# Patient Record
Sex: MALE | Race: WHITE
[De-identification: names, ages, dates, MRNs, and addresses within clinical notes are randomized per-mention and may not be internally consistent; named-entity substitution may affect disease eponyms.]

---

## 2018-03-20 ENCOUNTER — HOSPITAL ENCOUNTER (INPATIENT)
Dept: HOSPITAL 26 - MED | Age: 48
LOS: 3 days | Discharge: ANOTHER HOSPITAL | DRG: 196 | End: 2018-03-23
Attending: FAMILY MEDICINE | Admitting: FAMILY MEDICINE
Payer: COMMERCIAL

## 2018-03-20 VITALS — HEIGHT: 63 IN | WEIGHT: 126 LBS | BODY MASS INDEX: 22.32 KG/M2

## 2018-03-20 VITALS — DIASTOLIC BLOOD PRESSURE: 66 MMHG | SYSTOLIC BLOOD PRESSURE: 93 MMHG

## 2018-03-20 VITALS — DIASTOLIC BLOOD PRESSURE: 53 MMHG | SYSTOLIC BLOOD PRESSURE: 111 MMHG

## 2018-03-20 DIAGNOSIS — I47.1: ICD-10-CM

## 2018-03-20 DIAGNOSIS — Z94.1: ICD-10-CM

## 2018-03-20 DIAGNOSIS — I49.01: Primary | ICD-10-CM

## 2018-03-20 DIAGNOSIS — I48.91: ICD-10-CM

## 2018-03-20 DIAGNOSIS — E83.42: ICD-10-CM

## 2018-03-20 DIAGNOSIS — Z79.01: ICD-10-CM

## 2018-03-20 DIAGNOSIS — Z87.891: ICD-10-CM

## 2018-03-20 DIAGNOSIS — I73.9: ICD-10-CM

## 2018-03-20 DIAGNOSIS — I47.2: ICD-10-CM

## 2018-03-20 DIAGNOSIS — E78.5: ICD-10-CM

## 2018-03-20 DIAGNOSIS — Z95.810: ICD-10-CM

## 2018-03-20 DIAGNOSIS — Z79.899: ICD-10-CM

## 2018-03-20 DIAGNOSIS — Z79.82: ICD-10-CM

## 2018-03-20 DIAGNOSIS — Z89.511: ICD-10-CM

## 2018-03-20 DIAGNOSIS — I42.9: ICD-10-CM

## 2018-03-20 LAB
ALBUMIN FLD-MCNC: 3 G/DL (ref 3.4–5)
ANION GAP SERPL CALCULATED.3IONS-SCNC: 18.8 MMOL/L (ref 8–16)
AST SERPL-CCNC: 19 U/L (ref 15–37)
BASOPHILS # BLD AUTO: 0.8 K/UL (ref 0–0.22)
BASOPHILS NFR BLD AUTO: 0 % (ref 0–2)
BILIRUB SERPL-MCNC: 0.7 MG/DL (ref 0–1)
BUN SERPL-MCNC: 24 MG/DL (ref 7–18)
CHLORIDE SERPL-SCNC: 103 MMOL/L (ref 98–107)
CO2 SERPL-SCNC: 20.6 MMOL/L (ref 21–32)
CREAT SERPL-MCNC: 1.3 MG/DL (ref 0.7–1.3)
EOSINOPHIL # BLD AUTO: 0.1 K/UL (ref 0–0.4)
EOSINOPHIL NFR BLD AUTO: 1 % (ref 0–4)
ERYTHROCYTE [DISTWIDTH] IN BLOOD BY AUTOMATED COUNT: 16.1 % (ref 11.6–13.7)
GFR SERPL CREATININE-BSD FRML MDRD: 75 ML/MIN (ref 90–?)
GLUCOSE SERPL-MCNC: 149 MG/DL (ref 74–106)
HCT VFR BLD AUTO: 42 % (ref 36–52)
HGB BLD-MCNC: 13.5 G/DL (ref 12–18)
LYMPHOCYTES # BLD AUTO: 2.1 K/UL (ref 2–11.5)
LYMPHOCYTES NFR BLD AUTO: 23 % (ref 20.5–51.1)
MCH RBC QN AUTO: 28 PG (ref 27–31)
MCHC RBC AUTO-ENTMCNC: 32 G/DL (ref 33–37)
MCV RBC AUTO: 86 FL (ref 80–94)
MONOCYTES # BLD AUTO: 0.3 K/UL (ref 0.8–1)
MONOCYTES NFR BLD AUTO: 3 % (ref 1.7–9.3)
NEUTROPHILS # BLD AUTO: 5.8 K/UL (ref 1.8–7.7)
NEUTROPHILS NFR BLD AUTO: 73 % (ref 42.2–75.2)
PLATELET # BLD AUTO: 148 K/UL (ref 140–450)
POTASSIUM SERPL-SCNC: 4.4 MMOL/L (ref 3.5–5.1)
PROTHROMBIN TIME: 13.2 SECS (ref 10.8–13.4)
RBC # BLD AUTO: 4.9 MIL/UL (ref 4.2–6.1)
SODIUM SERPL-SCNC: 138 MMOL/L (ref 136–145)
WBC # BLD AUTO: 9.1 K/UL (ref 4.8–10.8)

## 2018-03-20 NOTE — NUR
PT ARRIVED IN THE UNIT AT 2220 VIA GURNEY. PT AWAKE, ALERT AND ORIENTED. ABLE TO MAKE NEEDS 
KNOWN. PT AFEBRILE. PT COOPERATIVE AND FRIENDLY. SINUS TACHYCARDIA ON MONITOR. PULSES ARE 
PALPABLE IN EXTREMITIES. DENIES CHEST PAIN. PT IN ROOM AIR. DENIES SOB. NO SIGNS OF DISTRESS 
NOTED. ABDOMEN ROUND, SOFT AND NONDISTENDED. BOWEL SOUNDS ACTIVE IN ALL QUADRANTS. PT HAS 
RIGHT AKA. SKIN INTACT. ACCORDING TO PT HE HAS PACEMAKER ON RIGHT CHEST. PT IS CONTINENT AND 
USES URINAL. PT CAME IN WITH RIGHT HAND PERIPHERAL IV 20G, WHEN LINE WAS FLUSHED, IT WAS 
LEAKING AND ALREADY [facility] OUT. LINE WAS DISCONTINUED. PT ALSO CAME IN WITH LEFT FOREARM 
PERIPHERAL IV 20G. LINE FLUSHED AND WAS PATENT AND ASYMPTOMATIC. MRSA SPECIMEN COLLECTED. 
CALL LIGHT WITHIN REACH. BED AT LOW POSSIBLE POSITION. HOB AT 30 DEGREES. ALL SAFETY 
PRECAUTIONS ARE IN PLACE. WILL CONTINUE TO MONITOR PT.

## 2018-03-20 NOTE — NUR
Patient will be admitted to care of . Admited to ICU.  Will go to room 
ICU BED 3. Belongings list completed.  Report to KIA MENDIETA.

## 2018-03-21 VITALS — DIASTOLIC BLOOD PRESSURE: 66 MMHG | SYSTOLIC BLOOD PRESSURE: 99 MMHG

## 2018-03-21 VITALS — SYSTOLIC BLOOD PRESSURE: 98 MMHG | DIASTOLIC BLOOD PRESSURE: 69 MMHG

## 2018-03-21 VITALS — DIASTOLIC BLOOD PRESSURE: 66 MMHG | SYSTOLIC BLOOD PRESSURE: 88 MMHG

## 2018-03-21 VITALS — SYSTOLIC BLOOD PRESSURE: 96 MMHG | DIASTOLIC BLOOD PRESSURE: 70 MMHG

## 2018-03-21 VITALS — SYSTOLIC BLOOD PRESSURE: 88 MMHG | DIASTOLIC BLOOD PRESSURE: 72 MMHG

## 2018-03-21 VITALS — DIASTOLIC BLOOD PRESSURE: 62 MMHG | SYSTOLIC BLOOD PRESSURE: 85 MMHG

## 2018-03-21 VITALS — DIASTOLIC BLOOD PRESSURE: 55 MMHG | SYSTOLIC BLOOD PRESSURE: 122 MMHG

## 2018-03-21 VITALS — SYSTOLIC BLOOD PRESSURE: 85 MMHG | DIASTOLIC BLOOD PRESSURE: 62 MMHG

## 2018-03-21 VITALS — SYSTOLIC BLOOD PRESSURE: 101 MMHG | DIASTOLIC BLOOD PRESSURE: 89 MMHG

## 2018-03-21 VITALS — SYSTOLIC BLOOD PRESSURE: 96 MMHG | DIASTOLIC BLOOD PRESSURE: 69 MMHG

## 2018-03-21 VITALS — DIASTOLIC BLOOD PRESSURE: 57 MMHG | SYSTOLIC BLOOD PRESSURE: 92 MMHG

## 2018-03-21 VITALS — DIASTOLIC BLOOD PRESSURE: 67 MMHG | SYSTOLIC BLOOD PRESSURE: 91 MMHG

## 2018-03-21 LAB
ANION GAP SERPL CALCULATED.3IONS-SCNC: 16.8 MMOL/L (ref 8–16)
BASOPHILS # BLD AUTO: 0.3 K/UL (ref 0–0.22)
BASOPHILS NFR BLD AUTO: 3.8 % (ref 0–2)
BUN SERPL-MCNC: 22 MG/DL (ref 7–18)
CHLORIDE SERPL-SCNC: 106 MMOL/L (ref 98–107)
CK MB SERPL-MCNC: 0.5 NG/ML (ref 0–3.6)
CK MB SERPL-MCNC: 0.7 NG/ML (ref 0–3.6)
CO2 SERPL-SCNC: 17.7 MMOL/L (ref 21–32)
CREAT SERPL-MCNC: 0.9 MG/DL (ref 0.7–1.3)
EOSINOPHIL # BLD AUTO: 0 K/UL (ref 0–0.4)
EOSINOPHIL NFR BLD AUTO: 0.4 % (ref 0–4)
ERYTHROCYTE [DISTWIDTH] IN BLOOD BY AUTOMATED COUNT: 15.6 % (ref 11.6–13.7)
GFR SERPL CREATININE-BSD FRML MDRD: 115 ML/MIN (ref 90–?)
GLUCOSE SERPL-MCNC: 82 MG/DL (ref 74–106)
HCT VFR BLD AUTO: 37.9 % (ref 36–52)
HGB BLD-MCNC: 12.4 G/DL (ref 12–18)
LYMPHOCYTES # BLD AUTO: 1.4 K/UL (ref 2–11.5)
LYMPHOCYTES NFR BLD AUTO: 21.9 % (ref 20.5–51.1)
MCH RBC QN AUTO: 28 PG (ref 27–31)
MCHC RBC AUTO-ENTMCNC: 33 G/DL (ref 33–37)
MCV RBC AUTO: 85 FL (ref 80–94)
MONOCYTES # BLD AUTO: 0.9 K/UL (ref 0.8–1)
MONOCYTES NFR BLD AUTO: 13 % (ref 1.7–9.3)
NEUTROPHILS # BLD AUTO: 4 K/UL (ref 1.8–7.7)
NEUTROPHILS NFR BLD AUTO: 60.9 % (ref 42.2–75.2)
PLATELET # BLD AUTO: 114 K/UL (ref 140–450)
POTASSIUM SERPL-SCNC: 4.5 MMOL/L (ref 3.5–5.1)
RBC # BLD AUTO: 4.45 MIL/UL (ref 4.2–6.1)
SODIUM SERPL-SCNC: 136 MMOL/L (ref 136–145)
WBC # BLD AUTO: 6.6 K/UL (ref 4.8–10.8)

## 2018-03-21 RX ADMIN — MYCOPHENOLATE MOFETIL SCH MG: 250 CAPSULE ORAL at 21:41

## 2018-03-21 RX ADMIN — Medication SCH MG: at 08:10

## 2018-03-21 RX ADMIN — HYDROCODONE BITARTRATE AND ACETAMINOPHEN PRN TAB: 5; 325 TABLET ORAL at 17:55

## 2018-03-21 RX ADMIN — HYDROCODONE BITARTRATE AND ACETAMINOPHEN PRN TAB: 5; 325 TABLET ORAL at 07:35

## 2018-03-21 RX ADMIN — CYCLOSPORINE SCH MG: 25 CAPSULE, LIQUID FILLED ORAL at 21:42

## 2018-03-21 NOTE — NUR
RECEIVED BEDSIDE REPORT FROM SHELLIE MENDIETA. PATIENT IS GSC 15, BUT AT THIS TIME SLEEPING BUT 
OPENS EYES SPONTANEOUSLY. ST ON CARDIAC MONITOR. VITAL SIGNS WNL. #22 IN RIGHT HAND 
RECEIVING NORMAL SALINE TKO. PATIENT'S DINNER TRAY IS ON BEDSIDE TABLE. 40% OF DINNER TRAY 
CONSUMED. INITIAL ASSESSMENT COMPLETED. HOB AT 30 DEGREES WITH BED IN LOWEST POSITION. CALL 
LIGHT WITHIN REACH. CONTINUE TO MONITOR PATIENT.

## 2018-03-21 NOTE — NUR
NO CHANGE IN CONDITION AT THIS TIME. VS STABLE. PT AROUSABLE. NO C/O PAIN. PT STATES THAT HE 
IS FEELING OKAY. PT OFFERED WASH CLOTH TO WASH HIMSELF BUT SAYS HE IS OKAY. SINUS 
TACHYCARDIA ON MONITOR. ALL SAFETY PRECAUTIONS ARE IN PLACE. WILL CONTINUE TO MONITOR PT.

## 2018-03-21 NOTE — NUR
PT IS RESTING COMFORTABLY AT THIS TIME. VITAL SIGNS STABLE. NO S/SX ACUTE DISTRESS NOTED. 
SAFETY PRECAUTIONS IN PLACE.

## 2018-03-21 NOTE — NUR
CALLED DR. CRUZ TO INFORM HIM THAT THERE IS AN ORDER FOR CARDIAC CONSULT FOR PT. DR. CRUZ SAID THAT HE WILL COME IN AND TAKE CARE OF THAT.

## 2018-03-21 NOTE — NUR
PATIENT SLEEPING IN BED. NO SIGNS OF RESPIRATORY DISTRESS NOTED. HOB AT 30 DEGREES WITH BED 
IN LOWEST POSITION. WILL CONTINUE TO MONITOR PATIENT.

## 2018-03-21 NOTE — NUR
PT LAYING DOWN COMFORTABLY, APPEARS TO BE SLEEPING. NO SIGNS OF DISTRESS NOTED. SINUS 
TACHYCARDIA ON MONITOR. VS STABLE AT THIS TIME. PT AROUSABLE TO NAME AND ACCORDING TO PT HIS 
BP REALLY RUNS LOW, SBP AROUND 80S TO 90S. PT DENIES FEELING DIZZY. CALL LIGHT WITHIN REACH. 
ALL SAFETY PRECAUTIONS ARE IN PLACE. WILL CONTINUE TO MONITOR.

## 2018-03-21 NOTE — NUR
RECEIVED REPORT FROM NIGHT NURSE. PT IS ASLEEP, BUT AROUSABLE TO NAME. AAO X4, ABLE TO MAKE 
NEEDS KNOWN AND FOLLOWS COMMANDS. SPEAKS ENGLISH. PT IS AFEBRILE, VITAL SIGNS STABLE. C/O 
RIGHT AKA PAIN OF 5/10. SINUS TACHYCARDIA ON MONITOR. ICD IN PLACE TO RIGHT UPPER CHEST, S/P 
HEART TRANSPLANT IN 2007. PULSES ARE PALPABLE TO ALL EXTREMITIES. DENIES CHEST PAIN. ON ROOM 
AIR, NO SOB NOTED. BREATHING EVEN AND UNLABORED. ABDOMEN SOFT, NONTENDER, NON DISTENDED, 
BOWEL SOUNDS PRESENT X 4 QUADRANTS. PT C/O PAIN AT PERIPHERAL IV G20 TO LEFT FOREARM, IV WAS 
DISCONTINUED. PT USES URINAL AND IS ABLE TO URINATE. SKIN INTACT, BUT PT HAS RIGHT AKA. NO 
EDEMA NOTED. BED IN LOWEST POSITION AND CALL LIGHT WITHIN REACH. WILL CONTINUE TO MONITOR.

## 2018-03-21 NOTE — NUR
PT SLEEPING. VS STABLE AT THIS TIME. NO CHANGE IN CONDITION. NO C/O OF ANY DISCOMFORT FROM 
PT. PT DOES NOT APPEAR TO BE IN ANY DISTRESS. CALL LIGHT WITHIN REACH. ALL SAFETY 
PRECAUTIONS ARE IN PLACE. SINUS TACHYCARDIA ON MONITOR. WILL CONTINUE TO MONITOR PT.

## 2018-03-21 NOTE — NUR
NO CHANGE OF CONDITION AT THIS TIME. VITAL SIGNS STABLE. ALL SAFETY PRECAUTIONS IN PLACE. 
WILL CONTINUE TO MONITOR.

## 2018-03-21 NOTE — NUR
SPOKE TO DR. HENRY. INFORMED MD REGARDING DR. YUN CRUZ'S STATING PATIENT IS OK TO 
TRANSFER TO TELEMETRY. DR. HENRY AGREED AND STATED OK. WILL FOLLOW UP WITH MD ORDERS.

## 2018-03-21 NOTE — NUR
PATIENT HAS BEEN SCREENED AND CATEGORIZED AS MODERATE NUTRITION RISK. PATIENT WILL BE SEEN 
WITHIN 3-5 DAYS OF ADMISSION.



3/23/18 - 3/25/18



[address]

## 2018-03-21 NOTE — NUR
CHARGE NURSE SPOKE WITH DR. CHURCH TO FOLLOW-UP REGARDING AMIODARONE DRIP. ACCORDING TO DR. CHURCH THERE IS NO NEED TO CONTINUE PT ON THE DRIP. WILL CONTINUE TO MONITOR PT.

## 2018-03-21 NOTE — NUR
TOLERATED DUE MEDICATIONS. PT DAUGHTER VISITING AT BEDSIDE. NO SIGNS OF DISTRESS NOTED. 
PATIENT'S NEEDS MET AT THIS TIME. CONTINUE TO MONITOR PATIENT.

## 2018-03-21 NOTE — NUR
PT HAVING LUNCH, FAMILY AT BEDSIDE. NO C/O PAIN OR DISCOMFORT. NEEDS WELL ATTENDED. SAFETY 
MEASURES ENSURED.

## 2018-03-22 VITALS — SYSTOLIC BLOOD PRESSURE: 97 MMHG | DIASTOLIC BLOOD PRESSURE: 63 MMHG

## 2018-03-22 VITALS — DIASTOLIC BLOOD PRESSURE: 61 MMHG | SYSTOLIC BLOOD PRESSURE: 90 MMHG

## 2018-03-22 VITALS — DIASTOLIC BLOOD PRESSURE: 57 MMHG | SYSTOLIC BLOOD PRESSURE: 87 MMHG

## 2018-03-22 VITALS — DIASTOLIC BLOOD PRESSURE: 90 MMHG | SYSTOLIC BLOOD PRESSURE: 158 MMHG

## 2018-03-22 VITALS — DIASTOLIC BLOOD PRESSURE: 58 MMHG | SYSTOLIC BLOOD PRESSURE: 92 MMHG

## 2018-03-22 VITALS — DIASTOLIC BLOOD PRESSURE: 71 MMHG | SYSTOLIC BLOOD PRESSURE: 101 MMHG

## 2018-03-22 VITALS — SYSTOLIC BLOOD PRESSURE: 103 MMHG | DIASTOLIC BLOOD PRESSURE: 75 MMHG

## 2018-03-22 LAB
ALBUMIN FLD-MCNC: 2.6 G/DL (ref 3.4–5)
ANION GAP SERPL CALCULATED.3IONS-SCNC: 17.6 MMOL/L (ref 8–16)
APPEARANCE UR: CLEAR
AST SERPL-CCNC: 20 U/L (ref 15–37)
BASOPHILS # BLD AUTO: 0.1 K/UL (ref 0–0.22)
BASOPHILS NFR BLD AUTO: 1.6 % (ref 0–2)
BILIRUB SERPL-MCNC: 1.1 MG/DL (ref 0–1)
BILIRUB UR QL STRIP: NEGATIVE
BUN SERPL-MCNC: 18 MG/DL (ref 7–18)
CHLORIDE SERPL-SCNC: 106 MMOL/L (ref 98–107)
CO2 SERPL-SCNC: 19.7 MMOL/L (ref 21–32)
COLOR UR: YELLOW
CREAT SERPL-MCNC: 1 MG/DL (ref 0.7–1.3)
EOSINOPHIL # BLD AUTO: 0 K/UL (ref 0–0.4)
EOSINOPHIL NFR BLD AUTO: 0.7 % (ref 0–4)
ERYTHROCYTE [DISTWIDTH] IN BLOOD BY AUTOMATED COUNT: 15.9 % (ref 11.6–13.7)
GFR SERPL CREATININE-BSD FRML MDRD: 102 ML/MIN (ref 90–?)
GLUCOSE SERPL-MCNC: 105 MG/DL (ref 74–106)
GLUCOSE UR STRIP-MCNC: NEGATIVE MG/DL
HCT VFR BLD AUTO: 37.2 % (ref 36–52)
HGB BLD-MCNC: 12.2 G/DL (ref 12–18)
HGB UR QL STRIP: NEGATIVE
LEUKOCYTE ESTERASE UR QL STRIP: NEGATIVE
LYMPHOCYTES # BLD AUTO: 1.1 K/UL (ref 2–11.5)
LYMPHOCYTES NFR BLD AUTO: 17.4 % (ref 20.5–51.1)
MCH RBC QN AUTO: 28 PG (ref 27–31)
MCHC RBC AUTO-ENTMCNC: 33 G/DL (ref 33–37)
MCV RBC AUTO: 85 FL (ref 80–94)
MONOCYTES # BLD AUTO: 0.7 K/UL (ref 0.8–1)
MONOCYTES NFR BLD AUTO: 10.5 % (ref 1.7–9.3)
NEUTROPHILS # BLD AUTO: 4.3 K/UL (ref 1.8–7.7)
NEUTROPHILS NFR BLD AUTO: 69.8 % (ref 42.2–75.2)
NITRITE UR QL STRIP: NEGATIVE
PH UR STRIP: 6 [PH] (ref 5–9)
PLATELET # BLD AUTO: 127 K/UL (ref 140–450)
POTASSIUM SERPL-SCNC: 4.3 MMOL/L (ref 3.5–5.1)
RBC # BLD AUTO: 4.4 MIL/UL (ref 4.2–6.1)
SODIUM SERPL-SCNC: 139 MMOL/L (ref 136–145)
WBC # BLD AUTO: 6.2 K/UL (ref 4.8–10.8)

## 2018-03-22 RX ADMIN — Medication SCH MG: at 10:35

## 2018-03-22 RX ADMIN — ATORVASTATIN CALCIUM SCH MG: 20 TABLET, FILM COATED ORAL at 10:37

## 2018-03-22 RX ADMIN — HYDROCODONE BITARTRATE AND ACETAMINOPHEN PRN TAB: 5; 325 TABLET ORAL at 10:37

## 2018-03-22 RX ADMIN — CYCLOSPORINE SCH MG: 25 CAPSULE, LIQUID FILLED ORAL at 10:40

## 2018-03-22 RX ADMIN — MYCOPHENOLATE MOFETIL SCH MG: 250 CAPSULE ORAL at 20:56

## 2018-03-22 RX ADMIN — MYCOPHENOLATE MOFETIL SCH MG: 250 CAPSULE ORAL at 10:36

## 2018-03-22 RX ADMIN — CYCLOSPORINE SCH MG: 25 CAPSULE, LIQUID FILLED ORAL at 20:56

## 2018-03-22 RX ADMIN — CARVEDILOL SCH MG: 3.12 TABLET, FILM COATED ORAL at 20:51

## 2018-03-22 RX ADMIN — RIVAROXABAN SCH MG: 10 TABLET, FILM COATED ORAL at 10:49

## 2018-03-22 NOTE — NUR
PATIENT SLEEPING IN BED. NO SIGNS OF SOB NOTED. VITAL SIGNS WNL. HOB AT 30 DEGREES WITH BED 
IN LOWEST POSITION. CONTINUE TO MONITOR PATIENT.

## 2018-03-22 NOTE — NUR
PATIENT REPORT RECEIVED FROM MORNING NURSE AT BEDSIDE. PATIENT IS AWAKE, ALERT AND ORIENTED. 
NO SIGNS AND SYMPTOMS OF DISTRESS NOTED. NO COMPLAINTS OF PAIN AT THIS TIME. PATIENT'S 
FAMILY IS AT BEDSIDE. PATIENT IS ON ROOM AIR. IV SITE NOTED ON RIGHT ARM, IVF INFUSING WELL. 
RIGHT AKA NOTED. PLAN OF CARE DISCUSSED WITH PATIENT, PATIENT VERBALIZED UNDERSTANDING. BED 
IN LOWEST POSITION, SIDE RAILS UP AND CALL LIGHT WITHIN REACH. WILL CONTINUE TO MONITOR.

## 2018-03-22 NOTE — NUR
CALLED DR CHAVARRIA REGARDING CONSULT FOR PATIENT, CHARI  STATED  IS WITH 
ANOTHER PATIENT AND WILL CALL BACK ONCE HE HAS A CHANCE.

## 2018-03-22 NOTE — NUR
PATIENT WAS SLEEPING, EASY TO AROUSE, VITAL SIGNS STABLE, NO S/S OF DISTRESS NOTED, 
RESPIRATION EVEN AND UNLABORED, CALL LIGHT WITHIN REACH, SAFETY MEASURE ENSURED, WILL 
CONTINUE TO MONITOR.

## 2018-03-22 NOTE — NUR
RECEIVED REPORT FROM NIGHTSHIFT  NURSE AT BEDSIDE. PT AWAKE A/O X4. NO S/S OF DISTRESS. PT 
ON RA. NO SOB. NO COMPLAINTS OF PAIN AT THIS TIME. IV LINE NOTED LEFT HAND 22G TKO. BED 
LOWERED CALL LIGHT WITHIN REACH. WILL CONTINUE TO MONITOR

## 2018-03-22 NOTE — NUR
CM NOTE

CONCURRENT REVIEW FAXED TO [facility] (FAX# [phone], ATTN: LIZETTE [phone]) AND [facility] (FAX# [phone], C: [phone])

## 2018-03-22 NOTE — NUR
PATIENT BP IS 97/63 'S-130'S, PAGED DR SHIN IF OKAY TO GIVE COREG 3.125 PO,  STATED 
TO ORDER COREG 1.562MG PO BID. WILL PLACE ORDERS.

## 2018-03-22 NOTE — NUR
INFORMED DR. OAKES ABOUT PATIENT'S IMPLANTED DFIB, AND THAT [facility] VISITED EARLIER TO CHECK 
IT. ALSO NOTIFIED DR. OAKES THAT [facility] STATED THAT IT WAS NOT FROM THEIR COMPANY. DR. OAKES WILL NOTIFY DR. CHAVARRIA.

## 2018-03-22 NOTE — NUR
PATIENT TRANSFERRED TO ROOM 120B IN [facility]. ALL PATIENT'S BELONGINGS ACCOMPANIED WITH PATIENT. 
PATIENT IN STABLE CONDITION. BEDSIDE REPORT GIVEN TO MELISSA MENDIETA.

## 2018-03-22 NOTE — NUR
REP CAME IN FROM [facility], CARDIAC RHYTHM MANAGEMENT DIVISION TO CHECK PATIENT'S 
DEFIBRILLATOR. STATED THAT DEFIBRILLATOR IS NOT FROM [facility]. REPRESENTATIVE SAID THAT HE 
NOTIFIED DR. CHAVARRIA.

## 2018-03-22 NOTE — NUR
PATIENT WAS SLEEPING, EASY TO AROUSE, NO S/S OF DISTRESS NOTED, RESPIRATION EVEN AND 
UNLABORED, CALL LIGHT WITHIN REACH, SAFETY MEASURE ENSURED, WILL CONTINUE TO MONITOR.

## 2018-03-22 NOTE — NUR
DR CHAVARRIA CALLED TWICE IN REGARDS TO PATIENT'S DEFIBRILLATOR HOWEVER PHONE CALL WAS CUT OFF, 
PAGED  HOWEVER,  IS ON CALL. WILL AWAIT CALLBACK.

## 2018-03-22 NOTE — NUR
PT RESTING AT THIS TIME. PT IN COMFORTABLE POSITION. NO PAIN. AND WITH FAMILY WILL CONTINUE 
TO MONITOR.

## 2018-03-22 NOTE — NUR
RECEIVED REPORT FROM ICU NURSE AT THE BEDSIDE. PATIENT AWAKE ALERT ORIENTED X4, NO S/S OF 
DISTRESS NOTED, RESPIRATION EVEN AND UNLABORED, TELE MONITOR PLACED ON PATIENT, IV PATENT 
AND INTACT, PLAN OF CARE DISCUSSED, PATIENT VERBALIZED UNDERSTANDING, CALL LIGHT WITHIN 
REACH, SAFETY MEASURE ENSURED, WILL CONTINUE TO MONITOR.

## 2018-03-22 NOTE — NUR
CALLED PLACED X2 TO PATIENT'S DAUGHTER ANGELICA CAGLE [phone] TO INFORM HER 
PATIENT TRANSFERRED TO [facility]. RECORDED GREETING RECEIVED THAT CELLULAR SUBSCRIBER IS NOT 
AVAILABLE. WILL INFORM MST RN.

## 2018-03-23 VITALS — DIASTOLIC BLOOD PRESSURE: 71 MMHG | SYSTOLIC BLOOD PRESSURE: 98 MMHG

## 2018-03-23 VITALS — SYSTOLIC BLOOD PRESSURE: 102 MMHG | DIASTOLIC BLOOD PRESSURE: 72 MMHG

## 2018-03-23 VITALS — DIASTOLIC BLOOD PRESSURE: 61 MMHG | SYSTOLIC BLOOD PRESSURE: 88 MMHG

## 2018-03-23 VITALS — SYSTOLIC BLOOD PRESSURE: 125 MMHG | DIASTOLIC BLOOD PRESSURE: 70 MMHG

## 2018-03-23 VITALS — SYSTOLIC BLOOD PRESSURE: 99 MMHG | DIASTOLIC BLOOD PRESSURE: 72 MMHG

## 2018-03-23 VITALS — DIASTOLIC BLOOD PRESSURE: 72 MMHG | SYSTOLIC BLOOD PRESSURE: 105 MMHG

## 2018-03-23 VITALS — DIASTOLIC BLOOD PRESSURE: 71 MMHG | SYSTOLIC BLOOD PRESSURE: 100 MMHG

## 2018-03-23 VITALS — DIASTOLIC BLOOD PRESSURE: 75 MMHG | SYSTOLIC BLOOD PRESSURE: 99 MMHG

## 2018-03-23 VITALS — DIASTOLIC BLOOD PRESSURE: 76 MMHG | SYSTOLIC BLOOD PRESSURE: 112 MMHG

## 2018-03-23 VITALS — SYSTOLIC BLOOD PRESSURE: 95 MMHG | DIASTOLIC BLOOD PRESSURE: 52 MMHG

## 2018-03-23 VITALS — SYSTOLIC BLOOD PRESSURE: 103 MMHG | DIASTOLIC BLOOD PRESSURE: 73 MMHG

## 2018-03-23 VITALS — SYSTOLIC BLOOD PRESSURE: 101 MMHG | DIASTOLIC BLOOD PRESSURE: 76 MMHG

## 2018-03-23 VITALS — SYSTOLIC BLOOD PRESSURE: 98 MMHG | DIASTOLIC BLOOD PRESSURE: 75 MMHG

## 2018-03-23 VITALS — DIASTOLIC BLOOD PRESSURE: 53 MMHG | SYSTOLIC BLOOD PRESSURE: 97 MMHG

## 2018-03-23 VITALS — DIASTOLIC BLOOD PRESSURE: 79 MMHG | SYSTOLIC BLOOD PRESSURE: 101 MMHG

## 2018-03-23 LAB
ALBUMIN FLD-MCNC: 2.6 G/DL (ref 3.4–5)
ANION GAP SERPL CALCULATED.3IONS-SCNC: 18.6 MMOL/L (ref 8–16)
AST SERPL-CCNC: 18 U/L (ref 15–37)
BASOPHILS # BLD AUTO: 0 K/UL (ref 0–0.22)
BASOPHILS NFR BLD AUTO: 0.8 % (ref 0–2)
BILIRUB SERPL-MCNC: 0.8 MG/DL (ref 0–1)
BUN SERPL-MCNC: 21 MG/DL (ref 7–18)
CHLORIDE SERPL-SCNC: 106 MMOL/L (ref 98–107)
CO2 SERPL-SCNC: 17.9 MMOL/L (ref 21–32)
CREAT SERPL-MCNC: 0.9 MG/DL (ref 0.7–1.3)
EOSINOPHIL # BLD AUTO: 0 K/UL (ref 0–0.4)
EOSINOPHIL NFR BLD AUTO: 0.7 % (ref 0–4)
ERYTHROCYTE [DISTWIDTH] IN BLOOD BY AUTOMATED COUNT: 15.9 % (ref 11.6–13.7)
GFR SERPL CREATININE-BSD FRML MDRD: 115 ML/MIN (ref 90–?)
GLUCOSE SERPL-MCNC: 96 MG/DL (ref 74–106)
HCT VFR BLD AUTO: 35 % (ref 36–52)
HGB BLD-MCNC: 11.9 G/DL (ref 12–18)
LYMPHOCYTES # BLD AUTO: 1.2 K/UL (ref 2–11.5)
LYMPHOCYTES NFR BLD AUTO: 19.6 % (ref 20.5–51.1)
MCH RBC QN AUTO: 29 PG (ref 27–31)
MCHC RBC AUTO-ENTMCNC: 34 G/DL (ref 33–37)
MCV RBC AUTO: 84.2 FL (ref 80–94)
MONOCYTES # BLD AUTO: 0.7 K/UL (ref 0.8–1)
MONOCYTES NFR BLD AUTO: 11.5 % (ref 1.7–9.3)
NEUTROPHILS # BLD AUTO: 4.2 K/UL (ref 1.8–7.7)
NEUTROPHILS NFR BLD AUTO: 67.4 % (ref 42.2–75.2)
PLATELET # BLD AUTO: 119 K/UL (ref 140–450)
POTASSIUM SERPL-SCNC: 3.5 MMOL/L (ref 3.5–5.1)
RBC # BLD AUTO: 4.15 MIL/UL (ref 4.2–6.1)
SODIUM SERPL-SCNC: 139 MMOL/L (ref 136–145)
WBC # BLD AUTO: 6.1 K/UL (ref 4.8–10.8)

## 2018-03-23 RX ADMIN — Medication SCH MG: at 09:20

## 2018-03-23 RX ADMIN — HYDROCODONE BITARTRATE AND ACETAMINOPHEN PRN TAB: 5; 325 TABLET ORAL at 09:20

## 2018-03-23 RX ADMIN — ATORVASTATIN CALCIUM SCH MG: 20 TABLET, FILM COATED ORAL at 09:20

## 2018-03-23 RX ADMIN — RIVAROXABAN SCH MG: 10 TABLET, FILM COATED ORAL at 09:32

## 2018-03-23 RX ADMIN — CYCLOSPORINE SCH MG: 25 CAPSULE, LIQUID FILLED ORAL at 09:35

## 2018-03-23 RX ADMIN — MYCOPHENOLATE MOFETIL SCH MG: 250 CAPSULE ORAL at 09:19

## 2018-03-23 RX ADMIN — CARVEDILOL SCH MG: 3.12 TABLET, FILM COATED ORAL at 09:00

## 2018-03-23 NOTE — NUR
CHECKED ON PATIENT. PATIENT IS ASLEEP. NO SIGNS AND SYMPTOMS OF DISTRESS NOTED. BREATHING 
EVEN AND UNLABORED. WILL CONTINUE TO MONITOR.

## 2018-03-23 NOTE — NUR
VS STABLE AT THIS TIME. PT AWAKE, ALERT, AND ORIENTED. ABLE TO MAKE NEEDS KNOWN. FAMILY AT 
BEDSIDE. PT AFEBRILE. COOPERATIVE TOWARDS STAFF. PT IN ROOM AIR. NO SIGNS OF RESPIRATORY 
DISTRESS NOTED. LUNG SOUNDS CLEAR. OXYGEN SATURATION WNL. S1+S2 HEARD. SINUS RHYTHM ON 
MONITOR. PULSES PALPABLE. ABDOMEN ROUND, SOFT AND NONDISTENDED. BOWEL SOUNDS ACTIVE IN ALL 
QUADRANTS. PT ABLE TO USE URINAL. SKIN IS INTACT. PT HAS RIGHT AKA. PT HAS RIGHT HAND 
PERIPHERAL IV 22G. LINE PATENT, INTACT, AND ASYMPTOMATIC. RECIEVED PT ON AMIODARONE DRIP. 
BED AT LOW POSSIBLE POSITION. ALL SAFETY PRECAUTIONS ARE IN PLACE. WILL CONTINUE TO MONITOR 
PT.

## 2018-03-23 NOTE — NUR
RECEIVED PATIENT REPORT AT BEDSIDE. PATIENT AWAKE ALERT AND ORIENTED. PATIENT ON ROOM AIR. 
NO SOB. PATIENT DENIES CHEST PAIN AT THIS TIME. PATIENT REPORTS OF PAIN ON HIS RIGHT STUMP. 
WILL MEDICATE. PATIENT ON TELE MONITORING. BED LOWERED WITH CALL LIGHT WITHIN REACH. WILL 
CONTINUE TO MONITOR

## 2018-03-23 NOTE — NUR
FAXED CONCURRENT REVIEW TO [facility]   [phone]    PHONE LIZETTE [phone]

I CALLED LIZETTE FROM [facility] AND INFORMED HER OF ORDER FOR HIGHER LEVEL, POS EFRAIN CHAUDHARI.    SHE 
SAID THEY ARE NOT CONTRACTED WITH THEM.   TO TRY [facility].    I CALLED [facility] AND SPOKE WITH 
ANDER [phone] AND FAXED INQUIRY.   

I SPOKE WITH LIZETTE AND SHE SAID THAT IF THE PATIENT GOES , THE AUTH FOR [facility] TRANSPORT IS 
[id number].

## 2018-03-23 NOTE — NUR
CALLED [facility] AMBULANCE DISPATCH. REQUESTED TO SEND BACK AMBULANCE TO THE UNIT. PATIENT WILL 
NOT BE ON [id number] DRIP  DURING TRANSPORT. PER DISPATCH, ETA WILL BE IN 2 HRS. CALLED 
[facility] [phone], SPOKE WITH GILBERT, UNIT SECRETARY AND INFORMED THAT AMBULANCE WILL 
 THE PATIENT HERE AT ABOUT 2100.

## 2018-03-23 NOTE — NUR
PATIENT SHOUTING FOR HELP. PATIENT REPORTS THAT HIS DEFIBRILLATOR HAS SHOCKED HIM. INFORMED 
DR CHAVARRIA.  ORDERS PATIENT TO BE TRANSFERRED TO ICU AND TO BE STARTED ON AMIODARONE DRIP. 
 ALSO ORDERS THE PATIENT TO BE TRANSFERRED TO HIGHER ACUITY OF CARE

## 2018-03-23 NOTE — NUR
PATIENT TRANSFERRED TO ICU. PATIENT AWAKE, ALERT AND ORIENTED. NO ACUTE S/S OF DISTRESS AT 
THIS TIME

## 2018-03-23 NOTE — NUR
CALLED ADRIEL MENDIETA FROM [facility] TO PROVIDE REPORT FOR CONTINUITY OF CARE.

## 2018-03-23 NOTE — NUR
RECEIVED BEDSIDE REPORT FROM [facility]UST RN, KIRILL, FOR CONTINUITY OF CARE. PATIENT IS AAOX4, ABLE 
TO MAKE NEEDS KNOWN, ABLE TO FOLLOW SIMPLE COMMANDS. PATIENT SKIN IS INTACT, WARM AND DRY. 
HAS PERIPHERAL IV SITE TO RIGHT HAND, PATENT, ASYMPTOMATIC, INTACT. PATIENT HAS HX OF HEART 
TRANSPLANT WITH BUILT IN DEFIB, COMPLAINT OF PAIN FROM [place], DOCTOR AWARE, PER RN, ST ON 
MONITOR. PATIENT IS ON ROOM AIR. CALL LIGHT WITHIN REACH, HOB IN SEMI FOWLERS IN LOW 
POSITION, WILL MONITOR CLOSELY

## 2018-03-23 NOTE — NUR
DR. ARIAS AT BEDSIDE TO EXAMINE PATIENT, UPDATED ON PATIENT'S CONDITION, WILL FOLLOW UP ON 
ANY ORDERS.

## 2018-03-23 NOTE — NUR
RECEIVED A CALL FROM ANETTE FROM [facility] TRANSFER [facility], [phone].   SHE SAID THE 
PATIENT HAS BEEN ACCEPTED UNDER .   SHE IS JUST WAITING FOR A BED.   I GAVE HER THE 
PHONE NUMBER TO ICU.

I CALLED ICU AND SPOKE WITH WAGNER AND INFORMED HER.   I TOLD HER TO GET THE RECORDS COPIED AND 
ON DISC.

I GAVE ANETTE THE AUTH NUMBER FROM [facility].

## 2018-03-23 NOTE — NUR
[facility] LEFT THE UNIT WITH THE PT. REPORT AND PACKET GIVEN TO TRANSPORT. VS STABLE. PT HAS NO 
BELONGINGS IN THE DRAWER. PT AWARE THAT HE IS BEING TRANSFERRED TO [facility]. PT SKIN IS INTACT. 
PT IN STABLE CONDITION WHEN HE LEFT THE UNIT.

## 2018-03-23 NOTE — NUR
[facility] AMBULANCE ARRIVE AT THIS TIME, FAMILY AT BEDSIDE. CALLED [facility] [phone], SPOKE WITH 
GILBERT (UNIT SECRETARY), AND INFORMED THAT [facility] ALREADY HERE TO  THE PATIENT.

## 2018-03-23 NOTE — NUR
I SPOKE WITH LIZETTE FROM [facility].   THE AUTH FOR [facility] IS [id number].   I CALLED ANDER AT [facility] 
AND GAVE HER THE AUTH NUMBER.

## 2018-03-23 NOTE — NUR
PATIENT SEEN BY RAIN FROM [facility]. INTERROGATION DONE AT BEDSIDE. FINDINGS 
REPORTED TO DR CHAVARRIA.